# Patient Record
Sex: FEMALE | Race: WHITE | NOT HISPANIC OR LATINO | Employment: STUDENT | URBAN - METROPOLITAN AREA
[De-identification: names, ages, dates, MRNs, and addresses within clinical notes are randomized per-mention and may not be internally consistent; named-entity substitution may affect disease eponyms.]

---
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
## 2023-04-04 ENCOUNTER — HOSPITAL ENCOUNTER (INPATIENT)
Facility: HOSPITAL | Age: 18
LOS: 1 days | Discharge: HOME | DRG: 343 | End: 2023-04-05
Attending: EMERGENCY MEDICINE | Admitting: SURGERY
Payer: COMMERCIAL

## 2023-04-04 ENCOUNTER — APPOINTMENT (OUTPATIENT)
Dept: CT IMAGING | Facility: HOSPITAL | Age: 18
DRG: 343 | End: 2023-04-04
Payer: COMMERCIAL

## 2023-04-04 DIAGNOSIS — K35.80 APPENDICITIS, ACUTE: ICD-10-CM

## 2023-04-04 DIAGNOSIS — K35.80 ACUTE APPENDICITIS, UNSPECIFIED ACUTE APPENDICITIS TYPE: Primary | ICD-10-CM

## 2023-04-04 LAB
ALBUMIN SERPL-MCNC: 4.1 G/DL (ref 3.5–5.2)
ALBUMIN/GLOB SERPL: 1.4 G/DL
ALP SERPL-CCNC: 104 U/L (ref 39–117)
ALT SERPL W P-5'-P-CCNC: 16 U/L (ref 1–33)
ANION GAP SERPL CALCULATED.3IONS-SCNC: 10.4 MMOL/L (ref 5–15)
AST SERPL-CCNC: 19 U/L (ref 1–32)
BASOPHILS # BLD AUTO: 0.06 10*3/MM3 (ref 0–0.2)
BASOPHILS NFR BLD AUTO: 0.4 % (ref 0–1.5)
BILIRUB SERPL-MCNC: 0.5 MG/DL (ref 0–1.2)
BILIRUB UR QL STRIP: NEGATIVE
BUN SERPL-MCNC: 12 MG/DL (ref 6–20)
BUN/CREAT SERPL: 19.4 (ref 7–25)
CALCIUM SPEC-SCNC: 9.3 MG/DL (ref 8.6–10.5)
CHLORIDE SERPL-SCNC: 102 MMOL/L (ref 98–107)
CLARITY UR: ABNORMAL
CO2 SERPL-SCNC: 23.6 MMOL/L (ref 22–29)
COLOR UR: YELLOW
CREAT SERPL-MCNC: 0.62 MG/DL (ref 0.57–1)
DEPRECATED RDW RBC AUTO: 42.5 FL (ref 37–54)
EGFRCR SERPLBLD CKD-EPI 2021: 130.9 ML/MIN/1.73
EOSINOPHIL # BLD AUTO: 0.12 10*3/MM3 (ref 0–0.4)
EOSINOPHIL NFR BLD AUTO: 0.8 % (ref 0.3–6.2)
ERYTHROCYTE [DISTWIDTH] IN BLOOD BY AUTOMATED COUNT: 14.2 % (ref 12.3–15.4)
GLOBULIN UR ELPH-MCNC: 3 GM/DL
GLUCOSE SERPL-MCNC: 98 MG/DL (ref 65–99)
GLUCOSE UR STRIP-MCNC: NEGATIVE MG/DL
HCG INTACT+B SERPL-ACNC: <1 MIU/ML
HCT VFR BLD AUTO: 36.6 % (ref 34–46.6)
HGB BLD-MCNC: 11.7 G/DL (ref 12–15.9)
HGB UR QL STRIP.AUTO: NEGATIVE
HOLD SPECIMEN: NORMAL
HOLD SPECIMEN: NORMAL
IMM GRANULOCYTES # BLD AUTO: 0.06 10*3/MM3 (ref 0–0.05)
IMM GRANULOCYTES NFR BLD AUTO: 0.4 % (ref 0–0.5)
KETONES UR QL STRIP: NEGATIVE
LEUKOCYTE ESTERASE UR QL STRIP.AUTO: ABNORMAL
LIPASE SERPL-CCNC: 21 U/L (ref 13–60)
LYMPHOCYTES # BLD AUTO: 3.33 10*3/MM3 (ref 0.7–3.1)
LYMPHOCYTES NFR BLD AUTO: 21.3 % (ref 19.6–45.3)
MCH RBC QN AUTO: 26.5 PG (ref 26.6–33)
MCHC RBC AUTO-ENTMCNC: 32 G/DL (ref 31.5–35.7)
MCV RBC AUTO: 83 FL (ref 79–97)
MONOCYTES # BLD AUTO: 1.11 10*3/MM3 (ref 0.1–0.9)
MONOCYTES NFR BLD AUTO: 7.1 % (ref 5–12)
NEUTROPHILS NFR BLD AUTO: 10.95 10*3/MM3 (ref 1.7–7)
NEUTROPHILS NFR BLD AUTO: 70 % (ref 42.7–76)
NITRITE UR QL STRIP: NEGATIVE
NRBC BLD AUTO-RTO: 0 /100 WBC (ref 0–0.2)
PH UR STRIP.AUTO: 8 [PH] (ref 5–8)
PLATELET # BLD AUTO: 286 10*3/MM3 (ref 140–450)
PMV BLD AUTO: 11 FL (ref 6–12)
POTASSIUM SERPL-SCNC: 4.2 MMOL/L (ref 3.5–5.2)
PROT SERPL-MCNC: 7.1 G/DL (ref 6–8.5)
PROT UR QL STRIP: NEGATIVE
RBC # BLD AUTO: 4.41 10*6/MM3 (ref 3.77–5.28)
SODIUM SERPL-SCNC: 136 MMOL/L (ref 136–145)
SP GR UR STRIP: 1.02 (ref 1–1.03)
UROBILINOGEN UR QL STRIP: ABNORMAL
WBC NRBC COR # BLD: 15.63 10*3/MM3 (ref 3.4–10.8)
WHOLE BLOOD HOLD COAG: NORMAL
WHOLE BLOOD HOLD SPECIMEN: NORMAL

## 2023-04-04 PROCEDURE — 81001 URINALYSIS AUTO W/SCOPE: CPT | Performed by: EMERGENCY MEDICINE

## 2023-04-04 PROCEDURE — 99284 EMERGENCY DEPT VISIT MOD MDM: CPT

## 2023-04-04 PROCEDURE — 99285 EMERGENCY DEPT VISIT HI MDM: CPT

## 2023-04-04 PROCEDURE — 25010000002 MORPHINE PER 10 MG: Performed by: EMERGENCY MEDICINE

## 2023-04-04 PROCEDURE — 85025 COMPLETE CBC W/AUTO DIFF WBC: CPT

## 2023-04-04 PROCEDURE — 80053 COMPREHEN METABOLIC PANEL: CPT | Performed by: EMERGENCY MEDICINE

## 2023-04-04 PROCEDURE — 83690 ASSAY OF LIPASE: CPT | Performed by: EMERGENCY MEDICINE

## 2023-04-04 PROCEDURE — 84702 CHORIONIC GONADOTROPIN TEST: CPT

## 2023-04-04 PROCEDURE — 96375 TX/PRO/DX INJ NEW DRUG ADDON: CPT

## 2023-04-04 PROCEDURE — 0 DIATRIZOATE MEGLUMINE & SODIUM PER 1 ML: Performed by: EMERGENCY MEDICINE

## 2023-04-04 PROCEDURE — 25010000002 ONDANSETRON PER 1 MG: Performed by: EMERGENCY MEDICINE

## 2023-04-04 RX ORDER — SODIUM CHLORIDE 0.9 % (FLUSH) 0.9 %
10 SYRINGE (ML) INJECTION AS NEEDED
Status: DISCONTINUED | OUTPATIENT
Start: 2023-04-04 | End: 2023-04-05 | Stop reason: HOSPADM

## 2023-04-04 RX ORDER — ONDANSETRON 2 MG/ML
4 INJECTION INTRAMUSCULAR; INTRAVENOUS ONCE
Status: COMPLETED | OUTPATIENT
Start: 2023-04-04 | End: 2023-04-04

## 2023-04-04 RX ORDER — MORPHINE SULFATE 2 MG/ML
2 INJECTION, SOLUTION INTRAMUSCULAR; INTRAVENOUS ONCE
Status: COMPLETED | OUTPATIENT
Start: 2023-04-04 | End: 2023-04-04

## 2023-04-04 RX ADMIN — MORPHINE SULFATE 2 MG: 2 INJECTION, SOLUTION INTRAMUSCULAR; INTRAVENOUS at 23:22

## 2023-04-04 RX ADMIN — DIATRIZOATE MEGLUMINE AND DIATRIZOATE SODIUM 30 ML: 660; 100 LIQUID ORAL; RECTAL at 23:26

## 2023-04-04 RX ADMIN — SODIUM CHLORIDE 500 ML: 9 INJECTION, SOLUTION INTRAVENOUS at 23:21

## 2023-04-04 RX ADMIN — ONDANSETRON 4 MG: 2 INJECTION INTRAMUSCULAR; INTRAVENOUS at 23:22

## 2023-04-05 ENCOUNTER — APPOINTMENT (OUTPATIENT)
Dept: CT IMAGING | Facility: HOSPITAL | Age: 18
DRG: 343 | End: 2023-04-05
Payer: COMMERCIAL

## 2023-04-05 ENCOUNTER — ANESTHESIA (OUTPATIENT)
Dept: PERIOP | Facility: HOSPITAL | Age: 18
DRG: 343 | End: 2023-04-05
Payer: COMMERCIAL

## 2023-04-05 ENCOUNTER — ANESTHESIA EVENT (OUTPATIENT)
Dept: PERIOP | Facility: HOSPITAL | Age: 18
DRG: 343 | End: 2023-04-05
Payer: COMMERCIAL

## 2023-04-05 ENCOUNTER — READMISSION MANAGEMENT (OUTPATIENT)
Dept: CALL CENTER | Facility: HOSPITAL | Age: 18
End: 2023-04-05
Payer: COMMERCIAL

## 2023-04-05 VITALS
BODY MASS INDEX: 26.64 KG/M2 | SYSTOLIC BLOOD PRESSURE: 109 MMHG | OXYGEN SATURATION: 100 % | TEMPERATURE: 97 F | RESPIRATION RATE: 16 BRPM | DIASTOLIC BLOOD PRESSURE: 65 MMHG | HEIGHT: 63 IN | WEIGHT: 150.35 LBS | HEART RATE: 84 BPM

## 2023-04-05 PROBLEM — K35.80 ACUTE APPENDICITIS, UNSPECIFIED ACUTE APPENDICITIS TYPE: Status: RESOLVED | Noted: 2023-04-05 | Resolved: 2023-04-05

## 2023-04-05 PROBLEM — K35.80 ACUTE APPENDICITIS, UNSPECIFIED ACUTE APPENDICITIS TYPE: Status: ACTIVE | Noted: 2023-04-05

## 2023-04-05 LAB
AMORPH URATE CRY URNS QL MICRO: ABNORMAL /HPF
BACTERIA UR QL AUTO: ABNORMAL /HPF
HYALINE CASTS UR QL AUTO: ABNORMAL /LPF
RBC # UR STRIP: ABNORMAL /HPF
REF LAB TEST METHOD: ABNORMAL
SQUAMOUS #/AREA URNS HPF: ABNORMAL /HPF
WBC # UR STRIP: ABNORMAL /HPF

## 2023-04-05 PROCEDURE — 88304 TISSUE EXAM BY PATHOLOGIST: CPT | Performed by: SURGERY

## 2023-04-05 PROCEDURE — 25010000002 FENTANYL CITRATE (PF) 50 MCG/ML SOLUTION: Performed by: ANESTHESIOLOGY

## 2023-04-05 PROCEDURE — 25010000002 KETOROLAC TROMETHAMINE PER 15 MG: Performed by: NURSE ANESTHETIST, CERTIFIED REGISTERED

## 2023-04-05 PROCEDURE — 25010000002 FENTANYL CITRATE (PF) 50 MCG/ML SOLUTION: Performed by: NURSE ANESTHETIST, CERTIFIED REGISTERED

## 2023-04-05 PROCEDURE — 74177 CT ABD & PELVIS W/CONTRAST: CPT

## 2023-04-05 PROCEDURE — 99223 1ST HOSP IP/OBS HIGH 75: CPT | Performed by: SURGERY

## 2023-04-05 PROCEDURE — 44970 LAPAROSCOPY APPENDECTOMY: CPT | Performed by: SURGERY

## 2023-04-05 PROCEDURE — 25510000001 IOPAMIDOL 61 % SOLUTION: Performed by: EMERGENCY MEDICINE

## 2023-04-05 PROCEDURE — 25010000002 HYDROMORPHONE PER 4 MG: Performed by: NURSE ANESTHETIST, CERTIFIED REGISTERED

## 2023-04-05 PROCEDURE — 25010000002 DEXAMETHASONE SODIUM PHOSPHATE 20 MG/5ML SOLUTION: Performed by: NURSE ANESTHETIST, CERTIFIED REGISTERED

## 2023-04-05 PROCEDURE — 44970 LAPAROSCOPY APPENDECTOMY: CPT | Performed by: SPECIALIST/TECHNOLOGIST, OTHER

## 2023-04-05 PROCEDURE — 25010000002 PROPOFOL 10 MG/ML EMULSION: Performed by: NURSE ANESTHETIST, CERTIFIED REGISTERED

## 2023-04-05 PROCEDURE — 25010000002 ONDANSETRON PER 1 MG: Performed by: NURSE ANESTHETIST, CERTIFIED REGISTERED

## 2023-04-05 PROCEDURE — 25010000002 LEVOFLOXACIN PER 250 MG: Performed by: EMERGENCY MEDICINE

## 2023-04-05 PROCEDURE — 0DTJ4ZZ RESECTION OF APPENDIX, PERCUTANEOUS ENDOSCOPIC APPROACH: ICD-10-PCS | Performed by: SURGERY

## 2023-04-05 PROCEDURE — 25010000002 FENTANYL CITRATE (PF) 50 MCG/ML SOLUTION

## 2023-04-05 PROCEDURE — 96365 THER/PROPH/DIAG IV INF INIT: CPT

## 2023-04-05 DEVICE — HORIZON TI ML 6 CLIPS/CART
Type: IMPLANTABLE DEVICE | Site: ASCENDING COLON | Status: FUNCTIONAL
Brand: WECK

## 2023-04-05 DEVICE — THE ECHELON, ECHELON ENDOPATH™ AND ECHELON FLEX™ FAMILIES OF ENDOSCOPIC LINEAR CUTTERS AND RELOADS ARE STERILE, SINGLE PATIENT USE INSTRUMENTS THAT SIMULTANEOUSLY CUT AND STAPLE TISSUE. THERE ARE SIX STAGGERED ROWS OF STAPLES, THREE ON EITHER SIDE OF THE CUT LINE. THE 45 MM INSTRUMENTS HAVE A STAPLE LINE THATIS APPROXIMATELY 45 MM LONG AND A CUT LINE THAT IS APPROXIMATELY 42 MM LONG. THE SHAFT CAN ROTATE FREELY IN BOTH DIRECTIONS AND AN ARTICULATION MECHANISM ON ARTICULATING INSTRUMENTS ENABLES BENDING THE DISTAL PORTIONOF THE SHAFT TO FACILITATE LATERAL ACCESS OF THE OPERATIVE SITE.THE INSTRUMENTS ARE SHIPPED WITHOUT A RELOAD AND MUST BE LOADED PRIOR TO USE. A STAPLE RETAINING CAP ON THE RELOAD PROTECTS THE STAPLE LEG POINTS DURING SHIPPING AND TRANSPORTATION. THE INSTRUMENTS’ LOCK-OUT FEATURE IS DESIGNED TO PREVENT A USED RELOAD FROM BEING REFIRED.
Type: IMPLANTABLE DEVICE | Site: ABDOMEN | Status: FUNCTIONAL
Brand: ECHELON ENDOPATH

## 2023-04-05 DEVICE — ENDOSCOPIC LINEAR CUTTER RELOADS GRAY 2.0 MM
Type: IMPLANTABLE DEVICE | Site: ABDOMEN | Status: FUNCTIONAL
Brand: ECHELON; ENDOPATH

## 2023-04-05 RX ORDER — FENTANYL CITRATE 50 UG/ML
INJECTION, SOLUTION INTRAMUSCULAR; INTRAVENOUS
Status: COMPLETED
Start: 2023-04-05 | End: 2023-04-05

## 2023-04-05 RX ORDER — LIDOCAINE HYDROCHLORIDE 10 MG/ML
0.5 INJECTION, SOLUTION EPIDURAL; INFILTRATION; INTRACAUDAL; PERINEURAL ONCE AS NEEDED
Status: DISCONTINUED | OUTPATIENT
Start: 2023-04-05 | End: 2023-04-05 | Stop reason: HOSPADM

## 2023-04-05 RX ORDER — LEVOFLOXACIN 5 MG/ML
750 INJECTION, SOLUTION INTRAVENOUS ONCE
Status: COMPLETED | OUTPATIENT
Start: 2023-04-05 | End: 2023-04-05

## 2023-04-05 RX ORDER — AMOXICILLIN 250 MG
1 CAPSULE ORAL DAILY
Qty: 60 TABLET | Refills: 0 | Status: SHIPPED | OUTPATIENT
Start: 2023-04-05

## 2023-04-05 RX ORDER — METRONIDAZOLE 500 MG/100ML
500 INJECTION, SOLUTION INTRAVENOUS ONCE
Status: COMPLETED | OUTPATIENT
Start: 2023-04-05 | End: 2023-04-05

## 2023-04-05 RX ORDER — HYDROMORPHONE HYDROCHLORIDE 1 MG/ML
0.5 INJECTION, SOLUTION INTRAMUSCULAR; INTRAVENOUS; SUBCUTANEOUS
Status: DISCONTINUED | OUTPATIENT
Start: 2023-04-05 | End: 2023-04-05 | Stop reason: HOSPADM

## 2023-04-05 RX ORDER — PROMETHAZINE HYDROCHLORIDE 25 MG/1
25 TABLET ORAL ONCE AS NEEDED
Status: DISCONTINUED | OUTPATIENT
Start: 2023-04-05 | End: 2023-04-05 | Stop reason: HOSPADM

## 2023-04-05 RX ORDER — DEXAMETHASONE SODIUM PHOSPHATE 4 MG/ML
INJECTION, SOLUTION INTRA-ARTICULAR; INTRALESIONAL; INTRAMUSCULAR; INTRAVENOUS; SOFT TISSUE AS NEEDED
Status: DISCONTINUED | OUTPATIENT
Start: 2023-04-05 | End: 2023-04-05 | Stop reason: SURG

## 2023-04-05 RX ORDER — PROMETHAZINE HYDROCHLORIDE 25 MG/1
25 SUPPOSITORY RECTAL ONCE AS NEEDED
Status: DISCONTINUED | OUTPATIENT
Start: 2023-04-05 | End: 2023-04-05 | Stop reason: HOSPADM

## 2023-04-05 RX ORDER — MIDAZOLAM HYDROCHLORIDE 1 MG/ML
1 INJECTION INTRAMUSCULAR; INTRAVENOUS
Status: DISCONTINUED | OUTPATIENT
Start: 2023-04-05 | End: 2023-04-05 | Stop reason: HOSPADM

## 2023-04-05 RX ORDER — KETOROLAC TROMETHAMINE 30 MG/ML
INJECTION, SOLUTION INTRAMUSCULAR; INTRAVENOUS AS NEEDED
Status: DISCONTINUED | OUTPATIENT
Start: 2023-04-05 | End: 2023-04-05 | Stop reason: SURG

## 2023-04-05 RX ORDER — FENTANYL CITRATE 50 UG/ML
50 INJECTION, SOLUTION INTRAMUSCULAR; INTRAVENOUS
Status: DISCONTINUED | OUTPATIENT
Start: 2023-04-05 | End: 2023-04-05 | Stop reason: HOSPADM

## 2023-04-05 RX ORDER — FAMOTIDINE 10 MG/ML
20 INJECTION, SOLUTION INTRAVENOUS ONCE
Status: COMPLETED | OUTPATIENT
Start: 2023-04-05 | End: 2023-04-05

## 2023-04-05 RX ORDER — NALOXONE HCL 0.4 MG/ML
0.2 VIAL (ML) INJECTION AS NEEDED
Status: DISCONTINUED | OUTPATIENT
Start: 2023-04-05 | End: 2023-04-05 | Stop reason: HOSPADM

## 2023-04-05 RX ORDER — EPHEDRINE SULFATE 50 MG/ML
5 INJECTION, SOLUTION INTRAVENOUS ONCE AS NEEDED
Status: DISCONTINUED | OUTPATIENT
Start: 2023-04-05 | End: 2023-04-05 | Stop reason: HOSPADM

## 2023-04-05 RX ORDER — HYDRALAZINE HYDROCHLORIDE 20 MG/ML
5 INJECTION INTRAMUSCULAR; INTRAVENOUS
Status: DISCONTINUED | OUTPATIENT
Start: 2023-04-05 | End: 2023-04-05 | Stop reason: HOSPADM

## 2023-04-05 RX ORDER — LABETALOL HYDROCHLORIDE 5 MG/ML
5 INJECTION, SOLUTION INTRAVENOUS
Status: DISCONTINUED | OUTPATIENT
Start: 2023-04-05 | End: 2023-04-05 | Stop reason: HOSPADM

## 2023-04-05 RX ORDER — METRONIDAZOLE 500 MG/100ML
500 INJECTION, SOLUTION INTRAVENOUS EVERY 8 HOURS
Status: DISCONTINUED | OUTPATIENT
Start: 2023-04-05 | End: 2023-04-05 | Stop reason: HOSPADM

## 2023-04-05 RX ORDER — ACETAMINOPHEN 325 MG/1
650 TABLET ORAL EVERY 6 HOURS PRN
Status: DISCONTINUED | OUTPATIENT
Start: 2023-04-05 | End: 2023-04-05 | Stop reason: HOSPADM

## 2023-04-05 RX ORDER — IPRATROPIUM BROMIDE 42 UG/1
2 SPRAY, METERED NASAL 4 TIMES DAILY
Status: DISCONTINUED | OUTPATIENT
Start: 2023-04-05 | End: 2023-04-05 | Stop reason: HOSPADM

## 2023-04-05 RX ORDER — SODIUM CHLORIDE, SODIUM LACTATE, POTASSIUM CHLORIDE, CALCIUM CHLORIDE 600; 310; 30; 20 MG/100ML; MG/100ML; MG/100ML; MG/100ML
75 INJECTION, SOLUTION INTRAVENOUS CONTINUOUS
Status: DISCONTINUED | OUTPATIENT
Start: 2023-04-05 | End: 2023-04-05 | Stop reason: HOSPADM

## 2023-04-05 RX ORDER — DIPHENHYDRAMINE HYDROCHLORIDE 50 MG/ML
12.5 INJECTION INTRAMUSCULAR; INTRAVENOUS
Status: DISCONTINUED | OUTPATIENT
Start: 2023-04-05 | End: 2023-04-05 | Stop reason: HOSPADM

## 2023-04-05 RX ORDER — SCOLOPAMINE TRANSDERMAL SYSTEM 1 MG/1
1 PATCH, EXTENDED RELEASE TRANSDERMAL ONCE
Status: DISCONTINUED | OUTPATIENT
Start: 2023-04-05 | End: 2023-04-05 | Stop reason: HOSPADM

## 2023-04-05 RX ORDER — FLUMAZENIL 0.1 MG/ML
0.2 INJECTION INTRAVENOUS AS NEEDED
Status: DISCONTINUED | OUTPATIENT
Start: 2023-04-05 | End: 2023-04-05 | Stop reason: HOSPADM

## 2023-04-05 RX ORDER — BUPIVACAINE HYDROCHLORIDE AND EPINEPHRINE 5; 5 MG/ML; UG/ML
INJECTION, SOLUTION EPIDURAL; INTRACAUDAL; PERINEURAL AS NEEDED
Status: DISCONTINUED | OUTPATIENT
Start: 2023-04-05 | End: 2023-04-05 | Stop reason: HOSPADM

## 2023-04-05 RX ORDER — OXYCODONE HYDROCHLORIDE AND ACETAMINOPHEN 5; 325 MG/1; MG/1
1 TABLET ORAL ONCE AS NEEDED
Status: DISCONTINUED | OUTPATIENT
Start: 2023-04-05 | End: 2023-04-05 | Stop reason: HOSPADM

## 2023-04-05 RX ORDER — OXYCODONE HYDROCHLORIDE AND ACETAMINOPHEN 5; 325 MG/1; MG/1
1 TABLET ORAL EVERY 6 HOURS PRN
Qty: 20 TABLET | Refills: 0 | Status: SHIPPED | OUTPATIENT
Start: 2023-04-05

## 2023-04-05 RX ORDER — MAGNESIUM HYDROXIDE 1200 MG/15ML
LIQUID ORAL AS NEEDED
Status: DISCONTINUED | OUTPATIENT
Start: 2023-04-05 | End: 2023-04-05 | Stop reason: HOSPADM

## 2023-04-05 RX ORDER — IPRATROPIUM BROMIDE AND ALBUTEROL SULFATE 2.5; .5 MG/3ML; MG/3ML
3 SOLUTION RESPIRATORY (INHALATION) ONCE AS NEEDED
Status: DISCONTINUED | OUTPATIENT
Start: 2023-04-05 | End: 2023-04-05 | Stop reason: HOSPADM

## 2023-04-05 RX ORDER — ONDANSETRON 4 MG/1
4 TABLET, FILM COATED ORAL EVERY 8 HOURS PRN
Qty: 10 TABLET | Refills: 0 | Status: SHIPPED | OUTPATIENT
Start: 2023-04-05 | End: 2023-04-21

## 2023-04-05 RX ORDER — SODIUM CHLORIDE, SODIUM LACTATE, POTASSIUM CHLORIDE, CALCIUM CHLORIDE 600; 310; 30; 20 MG/100ML; MG/100ML; MG/100ML; MG/100ML
9 INJECTION, SOLUTION INTRAVENOUS CONTINUOUS
Status: DISCONTINUED | OUTPATIENT
Start: 2023-04-05 | End: 2023-04-05 | Stop reason: HOSPADM

## 2023-04-05 RX ORDER — ROCURONIUM BROMIDE 10 MG/ML
INJECTION, SOLUTION INTRAVENOUS AS NEEDED
Status: DISCONTINUED | OUTPATIENT
Start: 2023-04-05 | End: 2023-04-05 | Stop reason: SURG

## 2023-04-05 RX ORDER — LEVOFLOXACIN 500 MG/1
500 TABLET, FILM COATED ORAL DAILY
Qty: 4 TABLET | Refills: 0 | Status: SHIPPED | OUTPATIENT
Start: 2023-04-05 | End: 2023-04-09

## 2023-04-05 RX ORDER — PROPOFOL 10 MG/ML
VIAL (ML) INTRAVENOUS AS NEEDED
Status: DISCONTINUED | OUTPATIENT
Start: 2023-04-05 | End: 2023-04-05 | Stop reason: SURG

## 2023-04-05 RX ORDER — ONDANSETRON 2 MG/ML
4 INJECTION INTRAMUSCULAR; INTRAVENOUS ONCE AS NEEDED
Status: DISCONTINUED | OUTPATIENT
Start: 2023-04-05 | End: 2023-04-05 | Stop reason: HOSPADM

## 2023-04-05 RX ORDER — ONDANSETRON 2 MG/ML
INJECTION INTRAMUSCULAR; INTRAVENOUS AS NEEDED
Status: DISCONTINUED | OUTPATIENT
Start: 2023-04-05 | End: 2023-04-05 | Stop reason: SURG

## 2023-04-05 RX ORDER — METRONIDAZOLE 500 MG/1
500 TABLET ORAL 3 TIMES DAILY
Qty: 12 TABLET | Refills: 0 | Status: SHIPPED | OUTPATIENT
Start: 2023-04-05 | End: 2023-04-09

## 2023-04-05 RX ORDER — DROPERIDOL 2.5 MG/ML
0.62 INJECTION, SOLUTION INTRAMUSCULAR; INTRAVENOUS
Status: DISCONTINUED | OUTPATIENT
Start: 2023-04-05 | End: 2023-04-05 | Stop reason: HOSPADM

## 2023-04-05 RX ORDER — FENTANYL CITRATE 50 UG/ML
50 INJECTION, SOLUTION INTRAMUSCULAR; INTRAVENOUS
Status: COMPLETED | OUTPATIENT
Start: 2023-04-05 | End: 2023-04-05

## 2023-04-05 RX ORDER — SODIUM CHLORIDE 0.9 % (FLUSH) 0.9 %
3 SYRINGE (ML) INJECTION EVERY 12 HOURS SCHEDULED
Status: DISCONTINUED | OUTPATIENT
Start: 2023-04-05 | End: 2023-04-05 | Stop reason: HOSPADM

## 2023-04-05 RX ORDER — LEVOFLOXACIN 5 MG/ML
750 INJECTION, SOLUTION INTRAVENOUS EVERY 24 HOURS
Status: DISCONTINUED | OUTPATIENT
Start: 2023-04-06 | End: 2023-04-05 | Stop reason: HOSPADM

## 2023-04-05 RX ORDER — DOCUSATE SODIUM 100 MG/1
100 CAPSULE, LIQUID FILLED ORAL 2 TIMES DAILY PRN
Status: DISCONTINUED | OUTPATIENT
Start: 2023-04-05 | End: 2023-04-05 | Stop reason: HOSPADM

## 2023-04-05 RX ORDER — HYDROCODONE BITARTRATE AND ACETAMINOPHEN 7.5; 325 MG/1; MG/1
1 TABLET ORAL ONCE AS NEEDED
Status: DISCONTINUED | OUTPATIENT
Start: 2023-04-05 | End: 2023-04-05 | Stop reason: HOSPADM

## 2023-04-05 RX ORDER — OXYCODONE HYDROCHLORIDE 5 MG/1
5 TABLET ORAL EVERY 4 HOURS PRN
Status: DISCONTINUED | OUTPATIENT
Start: 2023-04-05 | End: 2023-04-05 | Stop reason: HOSPADM

## 2023-04-05 RX ORDER — SODIUM CHLORIDE 0.9 % (FLUSH) 0.9 %
3-10 SYRINGE (ML) INJECTION AS NEEDED
Status: DISCONTINUED | OUTPATIENT
Start: 2023-04-05 | End: 2023-04-05 | Stop reason: HOSPADM

## 2023-04-05 RX ORDER — FENTANYL CITRATE 50 UG/ML
INJECTION, SOLUTION INTRAMUSCULAR; INTRAVENOUS AS NEEDED
Status: DISCONTINUED | OUTPATIENT
Start: 2023-04-05 | End: 2023-04-05 | Stop reason: SURG

## 2023-04-05 RX ORDER — LIDOCAINE HYDROCHLORIDE 20 MG/ML
INJECTION, SOLUTION INFILTRATION; PERINEURAL AS NEEDED
Status: DISCONTINUED | OUTPATIENT
Start: 2023-04-05 | End: 2023-04-05 | Stop reason: SURG

## 2023-04-05 RX ORDER — PROMETHAZINE HYDROCHLORIDE 12.5 MG/1
12.5 TABLET ORAL ONCE AS NEEDED
Status: DISCONTINUED | OUTPATIENT
Start: 2023-04-05 | End: 2023-04-05 | Stop reason: HOSPADM

## 2023-04-05 RX ORDER — OXYCODONE AND ACETAMINOPHEN 7.5; 325 MG/1; MG/1
1 TABLET ORAL EVERY 4 HOURS PRN
Status: DISCONTINUED | OUTPATIENT
Start: 2023-04-05 | End: 2023-04-05 | Stop reason: HOSPADM

## 2023-04-05 RX ADMIN — SCOPALAMINE 1 PATCH: 1 PATCH, EXTENDED RELEASE TRANSDERMAL at 11:38

## 2023-04-05 RX ADMIN — FAMOTIDINE 20 MG: 10 INJECTION INTRAVENOUS at 11:34

## 2023-04-05 RX ADMIN — FENTANYL CITRATE 50 MCG: 50 INJECTION, SOLUTION INTRAMUSCULAR; INTRAVENOUS at 17:28

## 2023-04-05 RX ADMIN — DEXAMETHASONE SODIUM PHOSPHATE 8 MG: 4 INJECTION, SOLUTION INTRAMUSCULAR; INTRAVENOUS at 16:04

## 2023-04-05 RX ADMIN — PROPOFOL 150 MG: 10 INJECTION, EMULSION INTRAVENOUS at 15:58

## 2023-04-05 RX ADMIN — HYDROMORPHONE HYDROCHLORIDE 0.5 MG: 1 INJECTION, SOLUTION INTRAMUSCULAR; INTRAVENOUS; SUBCUTANEOUS at 17:52

## 2023-04-05 RX ADMIN — LEVOFLOXACIN 750 MG: 5 INJECTION, SOLUTION INTRAVENOUS at 01:57

## 2023-04-05 RX ADMIN — ONDANSETRON 4 MG: 2 INJECTION INTRAMUSCULAR; INTRAVENOUS at 16:28

## 2023-04-05 RX ADMIN — IOPAMIDOL 85 ML: 612 INJECTION, SOLUTION INTRAVENOUS at 01:16

## 2023-04-05 RX ADMIN — SUGAMMADEX 200 MG: 100 INJECTION, SOLUTION INTRAVENOUS at 16:47

## 2023-04-05 RX ADMIN — HYDROMORPHONE HYDROCHLORIDE 0.5 MG: 1 INJECTION, SOLUTION INTRAMUSCULAR; INTRAVENOUS; SUBCUTANEOUS at 17:40

## 2023-04-05 RX ADMIN — FENTANYL CITRATE 50 MCG: 50 INJECTION, SOLUTION INTRAMUSCULAR; INTRAVENOUS at 15:56

## 2023-04-05 RX ADMIN — KETOROLAC TROMETHAMINE 30 MG: 30 INJECTION, SOLUTION INTRAMUSCULAR at 16:28

## 2023-04-05 RX ADMIN — LIDOCAINE HYDROCHLORIDE 100 MG: 20 INJECTION, SOLUTION INFILTRATION; PERINEURAL at 15:57

## 2023-04-05 RX ADMIN — METRONIDAZOLE 500 MG: 500 INJECTION, SOLUTION INTRAVENOUS at 02:26

## 2023-04-05 RX ADMIN — SODIUM CHLORIDE, POTASSIUM CHLORIDE, SODIUM LACTATE AND CALCIUM CHLORIDE 75 ML/HR: 600; 310; 30; 20 INJECTION, SOLUTION INTRAVENOUS at 05:10

## 2023-04-05 RX ADMIN — SODIUM CHLORIDE, POTASSIUM CHLORIDE, SODIUM LACTATE AND CALCIUM CHLORIDE 9 ML/HR: 600; 310; 30; 20 INJECTION, SOLUTION INTRAVENOUS at 11:34

## 2023-04-05 RX ADMIN — METRONIDAZOLE 500 MG: 500 INJECTION, SOLUTION INTRAVENOUS at 10:54

## 2023-04-05 RX ADMIN — ROCURONIUM BROMIDE 40 MG: 10 INJECTION, SOLUTION INTRAVENOUS at 15:58

## 2023-04-05 RX ADMIN — FENTANYL CITRATE 50 MCG: 0.05 INJECTION, SOLUTION INTRAMUSCULAR; INTRAVENOUS at 13:30

## 2023-04-05 RX ADMIN — FENTANYL CITRATE 50 MCG: 50 INJECTION, SOLUTION INTRAMUSCULAR; INTRAVENOUS at 16:47

## 2023-04-05 RX ADMIN — FENTANYL CITRATE 50 MCG: 0.05 INJECTION, SOLUTION INTRAMUSCULAR; INTRAVENOUS at 11:34

## 2023-04-05 NOTE — H&P
"Admission H&P    Admiting Physician: Robert Bagley MD    Reason for admission: Acute appendicitis.    Chief Complaint   Patient presents with   • Abdominal Pain       HPI:   The patient is a very pleasant 18 y.o. years old female that presented to the hospital with abdominal pain.  The pain is described as aching and sharp, and is 7/10 in intensity. Pain is located in the RLQ, suprapubic without radiation. Onset was several hours ago. Symptoms have been gradually improving since. Aggravating factors: activity.  Alleviating factors: recumbency. Associated symptoms: anorexia and nausea. The patient denies chills, fever, sweats and vomiting.   CT scan of the abdomen was performed that showed evidence of acute appendicitis     Past Medical History:   Diagnosis Date   • Epistaxis    • Menorrhagia    • Nexplanon insertion    • Sinus arrhythmia        History reviewed. No pertinent surgical history.      Current Facility-Administered Medications:   •  acetaminophen (TYLENOL) tablet 650 mg, 650 mg, Oral, Q6H PRN, Mary Cardenas MD  •  lactated ringers infusion, 75 mL/hr, Intravenous, Continuous, Mary Cardenas MD, Last Rate: 75 mL/hr at 04/05/23 0510, 75 mL/hr at 04/05/23 0510  •  [START ON 4/6/2023] levoFLOXacin (LEVAQUIN) 750 mg/150 mL D5W (premix) (LEVAQUIN) 750 mg, 750 mg, Intravenous, Q24H, Robert aBgley MD  •  metroNIDAZOLE (FLAGYL) IVPB 500 mg, 500 mg, Intravenous, Q8H, Robert Bagley MD  •  oxyCODONE (ROXICODONE) immediate release tablet 5 mg, 5 mg, Oral, Q4H PRN, Mary Cardenas MD  •  sodium chloride 0.9 % flush 10 mL, 10 mL, Intravenous, PRN, Lc Perry MD    Allergies   Allergen Reactions   • Amoxicillin Anaphylaxis   • Penicillins Hives     Big whelps, any \"cillin\"       Social History     Socioeconomic History   • Marital status: Single   Tobacco Use   • Smoking status: Never   • Smokeless tobacco: Never   Vaping Use   • Vaping Use: Some days   Substance and " Sexual Activity   • Alcohol use: No   • Drug use: Yes     Types: Marijuana     Comment: very occasionally       Family History   Family history unknown: Yes       ROS:   Constitutional: denies any weight changes, fatigue or weakness.  Eyes: : denies blurred or double vision  Cardiovascular: denies chest pain, palpitations, edemas.  Respiratory: denies cough, sputum, SOB.  Gastrointestinal: denies , diarrhea, constipation.  Genitourinary: denies dysuria, frequency.  Endocrine: denies cold intolerance, lethargy and flushing.  Hem: denies excessive bruising and postop bleeding.  Musculoskeletal: denies weakness, joint swelling, pain or stiffness.  Neuro: denies seizures, CVA, paresthesia, or peripheral neuropathy.   Skin: denies change in nevi, rashes, masses.    Physical Exam:   Vitals:    04/05/23 0521   BP: 97/60   Pulse: 96   Resp: 16   Temp: 97.1 °F (36.2 °C)   SpO2: 98%     GENERAL:alert, well appearing, and in no distress and oriented to person, place, and time  HEENT: normochephalic, atraumatic, no scleral icterus moist mucous membranes.  NECK: Supple there is no thyromegaly or lymphadenopathy  CHEST: clear to auscultation, no wheezes, rales or rhonchi, symmetric air entry  CARDIAC: regular rate and rhythm    ABDOMEN: soft,  nondistended, no masses or organomegaly  tenderness noted over right lower quadrant and suprapubic area. No rebound or guarding, no peritoneal signs  EXTREMITIES: no cyanosis, clubbing or edema    NEURO: alert and oriented, normal speech, cranial nerves 2-12 grossly intact, no focal deficits   SKIN: Moist, warm, no rashes.    Diagnostic workup:   CT abdomen and pelvis: dilated and thick appendix consistent with acute appendicitis    Wbc 15.6  hgb 11.7  cmp wnl    Assessment and plan:   The patient is a very pleasant 18 y.o. years old female with clinical as well as imaging findings of acute appendicitis.  I discussed with the patient about further step and option for treatment that would  include conservative management with antibiotic treatment versus laparoscopic appendectomy.  Risk and benefits of both approaches including approximately 50% failure with antibiotic therapy alone and the risk of bleeding, infections, intra-abdominal injuries, intra-abdominal abscess and stump leaks for laparoscopic appendectomy.  Patient has elected to undergo laparoscopic appendectomy.    Plan:    - Laparoscopic appendectomy, possible open.   - NPO  - IVF  - IV antibiotics  - Consent for laparoscopic appendectomy possible open    Case was discussed with Mary Cardenas MD and patient.    Risk and benefits of the current recommended treatment were explained to the patient that had time to ask questions that where answered, verbalized understanding and agreed with the plan.     Robert Bagley MD  General, Minimally Invasive and Endoscopic Surgery  [facility]    [address], [address]  [place], [place], [address]  P: [phone]  F: [phone]

## 2023-04-05 NOTE — ANESTHESIA PROCEDURE NOTES
Airway  Urgency: elective    Date/Time: 4/5/2023 4:00 PM  Airway not difficult    General Information and Staff    Patient location during procedure: OR  Anesthesiologist: Mic Gao MD  CRNA/CAA: Demetrius Seymour CRNA    Indications and Patient Condition  Indications for airway management: airway protection    Preoxygenated: yes  Mask difficulty assessment: 1 - vent by mask    Final Airway Details  Final airway type: endotracheal airway      Successful airway: ETT  Cuffed: yes   Successful intubation technique: direct laryngoscopy  Endotracheal tube insertion site: oral  Blade: Pablo  Blade size: 2  ETT size (mm): 7.0  Cormack-Lehane Classification: grade I - full view of glottis  Placement verified by: chest auscultation and capnometry   Measured from: lips  ETT/EBT  to lips (cm): 22  Number of attempts at approach: 1  Assessment: lips, teeth, and gum same as pre-op and atraumatic intubation    Additional Comments  Pre 02 100%, SIVI, DL x1, atraumatic intubation, BLBS, Positive ETC02.

## 2023-04-05 NOTE — ANESTHESIA PREPROCEDURE EVALUATION
Anesthesia Evaluation     Patient summary reviewed and Nursing notes reviewed   no history of anesthetic complications:  NPO Solid Status: > 8 hours  NPO Liquid Status: > 8 hours           Airway   Mallampati: II  Dental - normal exam     Pulmonary - normal exam   (+) a smoker Current,   Cardiovascular - negative cardio ROS and normal exam        Neuro/Psych- negative ROS  GI/Hepatic/Renal/Endo - negative ROS     Musculoskeletal     Abdominal    Substance History      OB/GYN          Other                        Anesthesia Plan    ASA 2     general     intravenous induction     Anesthetic plan, risks, benefits, and alternatives have been provided, discussed and informed consent has been obtained with: patient.        CODE STATUS:    Level Of Support Discussed With: Patient  Code Status (Patient has no pulse and is not breathing): CPR (Attempt to Resuscitate)  Medical Interventions (Patient has pulse or is breathing): Full Support  Release to patient: Routine Release

## 2023-04-05 NOTE — ANESTHESIA POSTPROCEDURE EVALUATION
"Patient: Gloria DOWNING Shorty    Procedure Summary     Date: 04/05/23 Room / Location: [facility] OR 09 / [facility] MAIN OR    Anesthesia Start: 1552 Anesthesia Stop: 1704    Procedure: APPENDECTOMY LAPAROSCOPIC (Abdomen) Diagnosis:     Surgeons: Robert Bagley MD Provider: Mic Gao MD    Anesthesia Type: general ASA Status: 2          Anesthesia Type: general    Vitals  Vitals Value Taken Time   /71 04/05/23 1816   Temp 36.7 °C (98 °F) 04/05/23 1700   Pulse 84 04/05/23 1817   Resp 16 04/05/23 1800   SpO2 100 % 04/05/23 1818   Vitals shown include unvalidated device data.        Post Anesthesia Care and Evaluation    Patient location during evaluation: bedside  Patient participation: complete - patient participated  Level of consciousness: awake and alert  Pain management: adequate    Airway patency: patent  Anesthetic complications: No anesthetic complications    Cardiovascular status: acceptable  Respiratory status: acceptable  Hydration status: acceptable    Comments: /65 (BP Location: Right arm, Patient Position: Lying)   Pulse 84   Temp 36.1 °C (97 °F)   Resp 16   Ht 160 cm (63\")   Wt 68.2 kg (150 lb 5.7 oz)   LMP 04/01/2023 Comment: serum hcg negative  SpO2 100%   BMI 26.63 kg/m²       "

## 2023-04-05 NOTE — OP NOTE
PREOPERATIVE DIAGNOSIS:  Acute Appendicitis  POSTOPERATIVE DIAGNOSIS:  Acute suppurative appendicitis   PROCEDURE:  Laparoscopic Appendectomy  SURGEON/STAFF:  JOANNE Bagley  ASSISTANT: TONY Forbes charge of retraction, exposure, closing wounds, holding camera and placing dressings as essential for the success of the case  ANESTHESIA:  General.   BLOOD LOSS: Minimal  COUNTS:  Needle and sponge count correct.  SPECIMENS:  Appendix    Findings: Acute appendicitis    INDICATIONS FOR OPERATION:  Gloria Oro is a 18 y.o. year old female who presented to to the ED for evaluation of abdominal pain.  She was found to have acute appendicitis on imaging.  She was examined and she was found to have findings consistent with acute appendicitis.  I discussed with the patient about treatment options that would include conservative management with antibiotics versus laparoscopic appendectomy.  Risk and benefits of both approaches were discussed in detail with the patient including the possibility of 50% failure of antibiotic only treatment versus the risk of bleeding, infection, abscess formation, intra-abdominal injuries, stump leak and the risk of anesthesia. After considering risk and benefits the patient has decided to proceed with laparoscopic appendectomy.    OPERATION:  The patient was brought to the operating room in stable condition.   Preoperative antibiotics were given and sequential compression devices were applied.  At this time, the patient was laid supine on the operating room table.  General anesthesia was induced by the Anesthesia service without difficulty. A tao catheter was placed by the nursing staff.  The patient's abdomen was prepped and draped in the usual sterile fashion.      At this time, the patient's abdomen was accessed at the level of the umbilicus with an open cutdown technique and insertion of a 5 mm trocar.  The abdomen was insufflated.  Brief survey of the abdominal cavity revealed no  intra-abdominal injury, and a large inflamed appendix.  The operation was continued by insertion of 2 additional 5 mm trocars, one in the mid clavicular line between the asis and umbilicus, and one in the suprapubic region.  The umbilical 5 mm port was replaced with a 12 mm port to allow for specimen removal and stapler passage. These were inserted under direct view.  The appendix was inflamed and adhered to the cecum.  The retroappendiceal window was created at the base of the appendix. A single firing of a [place] Laparoscopic stapler with a white load was used to transect the appendix at it's base. Here there was viable tissue, that was soft to touch.     I then continued dissection of the appendix from the cecum.  At the level of the tip the appendix was very close to the cecum and this was transected with ultrasonic alissa.  The mesentery of the appendix was then transected with ultrasonic alissa.  Because of the proximity of the ultrasonic shear to the cecum during dissection I decided to place a figure of eight 2-0 silk suture at the cecum.  The cecum look healthy and viable.  A small metallic clip was used over the appendix staple line.  Determinate the trocar was removed and the umbilical fascia was closed with 0 Vicryl Endo Close technique.  The appendix was then removed through the umbilical port site with the aid of an endo catch bag.    Next, the abdomen was inspected and irrigated.  The field was completely clean with no evidence of intra-abdominal injury or bleeding.  The 12 mm trocar was removed and the fascial defect was closed with 0 Vicryl and Endo Close technique.  All trocars were then removed under direct vision.  All skin incisions were closed with 4-0 Monocryl and surgical glue.      The patient was extubated in the recovery room in stable condition.  I, the attending surgeon, performed the entire operation.    Robert Bagley MD  General, Minimally Invasive and Endoscopic Surgery  [facility]    [address], [address]  [place], [place], [address]  P: [phone]  F: [phone]

## 2023-04-05 NOTE — ED NOTES
Pt reports sudden onset RLQ pain started this afternoon. The pain is accompanied by nausea and is constant in nature. Pt has not had any abdominal surgeries. Pt denies fever/chills. Pt was seen at urgent care today and they were concerned for appendicitis. Pt has a family history of issues with the appendix.

## 2023-04-05 NOTE — PLAN OF CARE
Goal Outcome Evaluation:              Outcome Evaluation: Patient up from ER, IVF infusing, NPO, up ad randy, voiding freely, will continue to monitor

## 2023-04-05 NOTE — ED PROVIDER NOTES
EMERGENCY DEPARTMENT ENCOUNTER    Room Number:  P681/1  Date of encounter:  4/5/2023  PCP: Kehrer, Meredith Lea, MD  Patient Care Team:  Kehrer, Meredith Lea, MD as PCP - General   Independent Historians: Patient    HPI:  Chief Complaint: Right lower quadrant pain    A complete HPI/ROS/PMH/PSH/SH/FH are unobtainable due to: None    Chronic or social conditions impacting patient care (Social Determinants of Health): None  (Financial Resource Strain / Food Insecurity / Transportation Needs / Physical Activity / Stress / Social Connections / Intimate Partner Violence / Housing Stability)    Context: Gloria Oro is a 18 y.o. female who presents to the ED c/o right lower quadrant pain that awoke her from sleep around noon today.  Reports some nausea but no vomiting no fevers or chills.  Patient was seen at urgent care today and referred to ED for work-up for appendicitis.  No fevers or chills no chest pain or shortness of breath.    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    PAST SURGICAL HISTORY  History reviewed. No pertinent surgical history.      FAMILY HISTORY  Family History   Family history unknown: Yes         SOCIAL HISTORY  Social History     Socioeconomic History    • Marital status: Single   Tobacco Use   • Smoking status: Never   • Smokeless tobacco: Never   Vaping Use   • Vaping Use: Some days   Substance and Sexual Activity   • Alcohol use: No   • Drug use: Yes     Types: Marijuana     Comment: very occasionally         ALLERGIES  Amoxicillin and Penicillins        REVIEW OF SYSTEMS  Review of Systems     All systems reviewed and negative except for those discussed in HPI.       PHYSICAL EXAM    I have reviewed the triage vital signs and nursing notes.    ED Triage Vitals   Temp Heart Rate Resp BP SpO2   04/04/23 2055 04/04/23 2055 04/04/23 2055 04/04/23 2126 04/04/23 2055   98.2 °F (36.8 °C) (!) 136 16 121/77 100 %      Temp src Heart Rate Source Patient Position BP Location FiO2 (%)   04/04/23 2055 -- -- -- --   Tympanic           Physical Exam  GENERAL: alert, no acute distress  SKIN: Warm, dry  HENT: Normocephalic, atraumatic  EYES: no scleral icterus  CV: regular rhythm, regular rate  RESPIRATORY: normal effort, lungs clear  ABDOMEN: soft, patient is tender to palpation over right lower quadrant small mount of guarding no rebound.  Rest of abdominal exam is nontender, nondistended  MUSCULOSKELETAL: no deformity  NEURO: alert, moves all extremities, follows commands          LAB RESULTS  Recent Results (from the past 24 hour(s))   Comprehensive Metabolic Panel    Collection Time: 04/04/23 10:05 PM    Specimen: Blood   Result Value Ref Range    Glucose 98 65 - 99 mg/dL    BUN 12 6 - 20 mg/dL    Creatinine 0.62 0.57 - 1.00 mg/dL    Sodium 136 136 - 145 mmol/L    Potassium 4.2 3.5 - 5.2 mmol/L    Chloride 102 98 - 107 mmol/L    CO2 23.6 22.0 - 29.0 mmol/L    Calcium 9.3 8.6 - 10.5 mg/dL    Total Protein 7.1 6.0 - 8.5 g/dL    Albumin 4.1 3.5 - 5.2 g/dL    ALT (SGPT) 16 1 - 33 U/L    AST (SGOT) 19 1 - 32 U/L    Alkaline Phosphatase 104 39 - 117 U/L    Total Bilirubin 0.5 0.0 - 1.2 mg/dL    Globulin 3.0 gm/dL    A/G Ratio 1.4 g/dL    BUN/Creatinine Ratio 19.4 7.0 - 25.0     Anion Gap 10.4 5.0 - 15.0 mmol/L    eGFR 130.9 >60.0 mL/min/1.73   Lipase    Collection Time: 04/04/23 10:05 PM    Specimen: Blood   Result Value Ref Range    Lipase 21 13 - 60 U/L   hCG, Quantitative, Pregnancy    Collection Time: 04/04/23 10:05 PM    Specimen: Blood   Result Value Ref Range    HCG Quantitative <1.00 mIU/mL   Green Top (Gel)    Collection Time: 04/04/23 10:05 PM   Result Value Ref Range    Extra Tube Hold for add-ons.    Lavender Top    Collection Time: 04/04/23 10:05 PM   Result Value Ref Range    Extra Tube hold for add-on    Gold Top - SST    Collection Time: 04/04/23 10:05 PM   Result Value Ref Range    Extra Tube Hold for add-ons.    Light Blue Top    Collection Time: 04/04/23 10:05 PM   Result Value Ref Range    Extra Tube Hold for add-ons.    CBC Auto Differential    Collection Time: 04/04/23 10:05 PM    Specimen: Blood   Result Value Ref Range    WBC 15.63 (H) 3.40 - 10.80 10*3/mm3    RBC 4.41 3.77 - 5.28 10*6/mm3    Hemoglobin 11.7 (L) 12.0 - 15.9 g/dL    Hematocrit 36.6 34.0 - 46.6 %    MCV 83.0 79.0 - 97.0 fL    MCH 26.5 (L) 26.6 - 33.0 pg    MCHC 32.0 31.5 - 35.7 g/dL    RDW 14.2 12.3 - 15.4 %    RDW-SD 42.5 37.0 - 54.0 fl    MPV 11.0 6.0 - 12.0 fL    Platelets 286 140 - 450 10*3/mm3    Neutrophil % 70.0 42.7 - 76.0 %    Lymphocyte % 21.3 19.6 - 45.3 %    Monocyte % 7.1 5.0 - 12.0 %    Eosinophil % 0.8 0.3 - 6.2 %    Basophil % 0.4 0.0 - 1.5 %    Immature Grans % 0.4 0.0 - 0.5 %    Neutrophils, Absolute 10.95 (H) 1.70 - 7.00 10*3/mm3    Lymphocytes, Absolute 3.33 (H) 0.70 - 3.10 10*3/mm3    Monocytes, Absolute 1.11 (H) 0.10 - 0.90 10*3/mm3    Eosinophils, Absolute 0.12 0.00 - 0.40 10*3/mm3    Basophils, Absolute 0.06 0.00 - 0.20 10*3/mm3    Immature Grans, Absolute 0.06 (H) 0.00 - 0.05 10*3/mm3    nRBC 0.0 0.0 - 0.2 /100 WBC   Urinalysis With Microscopic If Indicated (No Culture) - Urine, Clean Catch    Collection Time: 04/04/23 10:42 PM    Specimen: Urine, Clean Catch   Result Value  Ref Range    Color, UA Yellow Yellow, Straw    Appearance, UA Cloudy (A) Clear    pH, UA 8.0 5.0 - 8.0    Specific Gravity, UA 1.021 1.005 - 1.030    Glucose, UA Negative Negative    Ketones, UA Negative Negative    Bilirubin, UA Negative Negative    Blood, UA Negative Negative    Protein, UA Negative Negative    Leuk Esterase, UA Trace (A) Negative    Nitrite, UA Negative Negative    Urobilinogen, UA 1.0 E.U./dL 0.2 - 1.0 E.U./dL   Urinalysis, Microscopic Only - Urine, Clean Catch    Collection Time: 04/04/23 10:42 PM    Specimen: Urine, Clean Catch   Result Value Ref Range    RBC, UA 0-2 None Seen, 0-2 /HPF    WBC, UA 3-5 (A) None Seen, 0-2 /HPF    Bacteria, UA 1+ (A) None Seen /HPF    Squamous Epithelial Cells, UA 3-6 (A) None Seen, 0-2 /HPF    Hyaline Casts, UA None Seen None Seen /LPF    Amorphous Crystals, UA Moderate/2+ None Seen /HPF    Methodology Manual Light Microscopy        Ordered the above labs and independently reviewed the results.        RADIOLOGY  CT Abdomen Pelvis With Contrast    Result Date: 4/5/2023  CT OF THE ABDOMEN AND PELVIS WITH CONTRAST  HISTORY: Right lower quadrant pain  COMPARISON: None available.  TECHNIQUE: Axial CT imaging was obtained through the abdomen and pelvis. IV contrast was administered.  FINDINGS: Images through the lung bases are clear. The stomach, duodenum, adrenal glands, pancreas, and gallbladder are all appear normal. Spleen is enlarged, measuring 14.2 cm in craniocaudal dimensions. The kidneys enhance symmetrically. There is no hydronephrosis. Uterus is normal. There is a dominant left ovarian cyst, measuring 1.9 x 1.3 cm. There is no bowel obstruction. The patient's appendix is dilated, and there is adjacent periappendiceal soft tissue stranding. Maximum diameter of the appendix is 1 cm. There is adjacent periappendiceal soft tissue stranding. Appearance is characteristic of acute appendicitis. No extraluminal gas or fluid collection is seen to suggest perforation  with abscess formation. There are some prominent adjacent lymph nodes, likely reactive. No acute osseous abnormalities are seen.      Acute appendicitis. No extraluminal gas or fluid collection is seen to suggest perforation abscess formation.  Radiation dose reduction techniques were utilized, including automated exposure control and exposure modulation based on body size.  This report was finalized on 4/5/2023 1:41 AM by Dr. Edith Ivan M.D.        I ordered the above noted radiological studies. Reviewed by me and discussed with radiologist.  See dictation for official radiology interpretation.      PROCEDURES    Procedures      MEDICATIONS GIVEN IN ER    Medications   sodium chloride 0.9 % flush 10 mL (has no administration in time range)   acetaminophen (TYLENOL) tablet 650 mg (has no administration in time range)   oxyCODONE (ROXICODONE) immediate release tablet 5 mg (has no administration in time range)   lactated ringers infusion (75 mL/hr Intravenous New Bag 4/5/23 0510)   sodium chloride 0.9 % bolus 500 mL (0 mL Intravenous Stopped 4/5/23 0045)   morphine injection 2 mg (2 mg Intravenous Given 4/4/23 2322)   ondansetron (ZOFRAN) injection 4 mg (4 mg Intravenous Given 4/4/23 2322)   diatrizoate meglumine-sodium (GASTROGRAFIN) 66-10 % oral solution 30 mL (30 mL Oral Given 4/4/23 2326)   iopamidol (ISOVUE-300) 61 % injection 100 mL (85 mL Intravenous Given 4/5/23 0116)   metroNIDAZOLE (FLAGYL) IVPB 500 mg (500 mg Intravenous New Bag 4/5/23 0226)   levoFLOXacin (LEVAQUIN) 750 mg/150 mL D5W (premix) (LEVAQUIN) 750 mg (0 mg Intravenous Stopped 4/5/23 0225)         ORDERS PLACED DURING THIS VISIT:  Orders Placed This Encounter   Procedures   • CT Abdomen Pelvis With Contrast   • [place] Draw   • Comprehensive Metabolic Panel   • Lipase   • Urinalysis With Microscopic If Indicated (No Culture) - Urine, Clean Catch   • hCG, Quantitative, Pregnancy   • CBC Auto Differential   • Urinalysis, Microscopic Only -  Urine, Clean Catch   • NPO Diet NPO Type: Strict NPO   • Undress & Gown   • Surgery (on-call MD unless specified)   • Insert Peripheral IV   • Inpatient Admission   • CBC & Differential   • Green Top (Gel)   • Lavender Top   • Gold Top - SST   • Light Blue Top         PROGRESS, DATA ANALYSIS, CONSULTS, AND MEDICAL DECISION MAKING    All labs have been independently interpreted by me.  All radiology studies have been reviewed by me and discussed with radiologist dictating the report.   EKG's independently viewed and interpreted by me.  Discussion below represents my analysis of pertinent findings related to patient's condition, differential diagnosis, treatment plan and final disposition.    My differential diagnosis includes but is not limited to:  Gastritis, gastroenteritis, peptic ulcer disease, GERD, esophageal perforation, acute appendicitis, mesenteric adenitis, Meckel’s diverticulum, epiploic appendagitis, diverticulitis, colon cancer, ulcerative colitis, Crohn’s disease, intussusception, small bowel obstruction, adhesions, ischemic bowel, perforated viscus, ileus, obstipation, biliary colic, cholecystitis, cholelithiasis, Enrrique-Satya Luis Alfredo, hepatitis, pancreatitis, common bile duct obstruction, cholangitis, bile leak, splenic trauma, splenic rupture, splenic infarction, splenic abscess, abdominal abscess, ascites, spontaneous bacterial peritonitis, hernia, UTI, cystitis, prostatitis, ureterolithiasis, urinary obstruction, ovarian cyst, torsion, pregnancy, ectopic pregnancy, PID, pelvic abscess, mittelschmerz, endometriosis, AAA, myocardial infarction, pneumonia, cancer, porphyria, DKA, medications, sickle cell, viral syndrome, zoster      ED Course as of 04/05/23 0656   Wed Apr 05, 2023   0016 WBC(!): 15.63 [TJ]   0016 HCG Quantitative: <1.00 [TJ]   0016 Lipase: 21 [TJ]   0016 Creatinine: 0.62 [TJ]   0016 WBC, UA(!): 3-5 [TJ]   0150 I have independently reviewed patient's CT abdomen pelvis; my interpretation  is periappendiceal stranding.  No obvious free air. [TJ]   0200 Patient with acute appendicitis.  Will admit for surgery. [TJ]      ED Course User Index  [TJ] Lc Perry MD       I interpreted the cardiac monitor rhythm and my independent interpretation is: normal sinus rhythm.     PPE: The patient wore a mask and I wore an N95 mask throughout the entire patient encounter.       AS OF 06:56 EDT VITALS:    BP - 97/60  HR - 96  TEMP - 97.1 °F (36.2 °C) (Oral)  O2 SATS - 98%        DIAGNOSIS  Final diagnoses:   Acute appendicitis, unspecified acute appendicitis type         DISPOSITION  ED Disposition     ED Disposition   Decision to Admit    Condition   --    Comment   Level of Care: Med/Surg [1]   Diagnosis: Acute appendicitis, unspecified acute appendicitis type [[id number]]   Admitting Physician: KAROL MIGUEL [[id number]]   Certification: I Certify That Inpatient Hospital Services Are Medically Necessary For Greater Than 2 Midnights                  Note Disclaimer: At [facility], we believe that sharing information builds trust and better relationships. You are receiving this note because you recently visited [facility]. It is possible you will see health information before a provider has talked with you about it. This kind of information can be easy to misunderstand. To help you fully understand what it means for your health, we urge you to discuss this note with your provider.       Lc Perry MD  04/05/23 0656

## 2023-04-06 ENCOUNTER — TRANSITIONAL CARE MANAGEMENT TELEPHONE ENCOUNTER (OUTPATIENT)
Dept: CALL CENTER | Facility: HOSPITAL | Age: 18
End: 2023-04-06
Payer: COMMERCIAL

## 2023-04-06 NOTE — OUTREACH NOTE
Call Center TCM Note    Flowsheet Row Responses   [facility] patient discharged from? [facility]   Does the patient have one of the following disease processes/diagnoses(primary or secondary)? General Surgery   TCM attempt successful? Yes   Call start time 1204   Call end time 1208   Discharge diagnosis Acute appendicitis,   Laparoscopic Appendectomy   Does the patient have all medications related to this admission filled (includes all antibiotics, pain medications, etc.) Yes   Is the patient taking all medications as directed (includes completed medication regime)? Yes   Does the patient have an appointment with their PCP within 7 days of discharge? No   Nursing Interventions Patient desires to follow up with specialty only   Has home health visited the patient within 72 hours of discharge? N/A   Psychosocial issues? No   Did the patient receive a copy of their discharge instructions? Yes   What is the patient's perception of their health status since discharge? Improving   Is the patient/caregiver able to teach back the hierarchy of who to call/visit for symptoms/problems? PCP, Specialist, Home health nurse, Urgent Care, ED, 911 Yes   TCM call completed? Yes   Wrap up additional comments Quick call, doing ok, she will be following up with her surgeon.   Call end time 1208   Would this patient benefit from a Referral to Amb Social Work? No   Is the patient interested in additional calls from an ambulatory ?  NOTE:  applies to high risk patients requiring additional follow-up. No          Meredith Ramirez RN    4/6/2023, 12:08 EDT

## 2023-04-06 NOTE — DISCHARGE SUMMARY
Admission date: 4/4/2023   Discharge date: 4/5/2023  8:39 PM     Admission diagnosis: Acute appendicitis, unspecified acute appendicitis type [K35.80]     Discharge diagnosis: Acute appendicitis     Procedure: Laparoscopic appendectomy    Hospital course: Patient admitted with a diagnosis of appendicitis.  She underwent laparoscopic appendectomy.  Postoperative course was uneventful and patient was discharged on postoperative day 0.     Meds:      Your medication list      START taking these medications      Instructions Last Dose Given Next Dose Due   levoFLOXacin 500 MG tablet  Commonly known as: Levaquin      Take 1 tablet by mouth Daily for 4 days.       metroNIDAZOLE 500 MG tablet  Commonly known as: Flagyl      Take 1 tablet by mouth 3 (Three) Times a Day for 4 days.       ondansetron 4 MG tablet  Commonly known as: Zofran      Take 1 tablet by mouth Every 8 (Eight) Hours As Needed for Nausea or Vomiting.       oxyCODONE-acetaminophen 5-325 MG per tablet  Commonly known as: PERCOCET      Take 1 tablet by mouth Every 6 (Six) Hours As Needed (Pain).       Senexon-S 8.6-50 MG per tablet  Generic drug: sennosides-docusate      Take 1 tablet by mouth Daily.          CONTINUE taking these medications      Instructions Last Dose Given Next Dose Due   desogestrel-ethinyl estradiol 0.15-30 MG-MCG per tablet  Commonly known as: Desogen      Take 1 tablet by mouth Daily.       Humira Pen-Psor/Uveit Starter 80 MG/0.8ML & 40MG/0.4ML Pen-injector Kit  Generic drug: Adalimumab           Humira Pen 40 MG/0.4ML Pen-injector Kit  Generic drug: Adalimumab           ipratropium 0.06 % nasal spray  Commonly known as: ATROVENT      2 sprays into the nostril(s) as directed by provider 4 (Four) Times a Day.       mupirocin 2 % ointment  Commonly known as: BACTROBAN      Apply 1 application topically to the appropriate area as directed 2 (Two) Times a Day As Needed (lesion/rash).       naproxen 500 MG tablet  Commonly known as:  Naprosyn      Take 1 tablet by mouth 2 (Two) Times a Day With Meals.       Nexplanon 68 MG implant subdermal implant  Generic drug: Etonogestrel           valACYclovir 1000 MG tablet  Commonly known as: Valtrex      Take 1 tablet by mouth Daily.       VITAMIN D PO      Take  by mouth.             Where to Get Your Medications      These medications were sent to [place] [id number]    Hours: 7:00 AM-6:00 PM Mon-Fri, 8:00 AM-4:30 PM Sat-Sun (Closed 12-12:30PM) Phone: [phone]   · levoFLOXacin 500 MG tablet  · metroNIDAZOLE 500 MG tablet  · ondansetron 4 MG tablet  · oxyCODONE-acetaminophen 5-325 MG per tablet  · Senexon-S 8.6-50 MG per tablet         Instructions:    - No heavy lifting of more than 15 lb for 6 weeks. Can start doing aerobic type exercise in 4 weeks.   - No driving while taking pain medications  - Take medications as prescribed.   - Can shower, no bath tub    Follow up: Dr. Bagley in 2 weeks, call for appointment      Robert Bagley MD  General, Minimally Invasive and Endoscopic Surgery  [facility]

## 2023-04-06 NOTE — OUTREACH NOTE
Prep Survey    Flowsheet Row Responses   [facility] patient discharged from? [place]   Is LACE score < 7 ? Yes   Eligibility [facility]   Date of Admission 04/04/23   Date of Discharge 04/05/23   Discharge Disposition Home or Self Care   Discharge diagnosis Acute appendicitis,   Laparoscopic Appendectomy   Does the patient have one of the following disease processes/diagnoses(primary or secondary)? General Surgery   Does the patient have Home health ordered? No   Is there a DME ordered? No   Prep survey completed? Yes          Karolyn MEDINA - Registered Nurse

## 2023-04-06 NOTE — PROGRESS NOTES
Case Management Discharge Note      Final Note: Discharged home. Adore Mckinney, JERAMIE              Transportation Services  Private: Car    Final Discharge Disposition Code: 01 - home or self-care

## 2023-04-07 LAB
LAB AP CASE REPORT: NORMAL
PATH REPORT.FINAL DX SPEC: NORMAL
PATH REPORT.GROSS SPEC: NORMAL

## 2023-04-21 ENCOUNTER — OFFICE VISIT (OUTPATIENT)
Dept: SURGERY | Facility: CLINIC | Age: 18
End: 2023-04-21
Payer: COMMERCIAL

## 2023-04-21 DIAGNOSIS — Z09 ENCOUNTER FOR FOLLOW-UP: Primary | ICD-10-CM

## 2023-04-21 PROCEDURE — 99024 POSTOP FOLLOW-UP VISIT: CPT | Performed by: PHYSICIAN ASSISTANT

## 2023-04-21 NOTE — LETTER
April 21, 2023     Patient: Gloria Oro   YOB: 2005   Date of Visit: 4/21/2023       To Whom It May Concern:    It is my medical opinion that Gloria Oro may return to work on May 5, 2023 without restrictions .           Sincerely,        Demetrius Campos PA-C    CC:   No Recipients

## 2023-04-21 NOTE — PROGRESS NOTES
CC:  Postop follow-up    S:  This is a 18-year-old lady returning to the office today status post laparoscopic appendectomy that was performed on 4/5/2023.  Overall she is doing very well since surgery with her only complaint being sore muscles when stretching.  The symptoms that she was having prior to surgery have all resolved.  She denies having nausea, vomiting, abdominal distention, abdominal pain, changes to her bowel habits or any other complaints.    O:  All 3 incisions are healing very well with the glue having peeled off the majority of these incisions.  She was informed the remainder the glue will continue to peel off.  Abdomen is soft, nontender, nondistended    A/P:  Pathology was reviewed with her which indicated that she had moderate to marked acute appendicitis with serositis and periappendicitis.  I encouraged her to continue to return to her normal activities using her body as her guide.  I recommended that she not lift greater than 20 pounds until she is a full 4 weeks out from surgery.  I did write her a return to work note for 5/5/2023 without restrictions.  All questions were answered and she was willing to proceed with all recommendations.    Demetrius Campos PA-C

## 2023-08-03 ENCOUNTER — TELEPHONE (OUTPATIENT)
Dept: FAMILY MEDICINE CLINIC | Facility: CLINIC | Age: 18
End: 2023-08-03

## 2023-08-03 ENCOUNTER — TELEMEDICINE (OUTPATIENT)
Dept: FAMILY MEDICINE CLINIC | Facility: CLINIC | Age: 18
End: 2023-08-03
Payer: COMMERCIAL

## 2023-08-03 DIAGNOSIS — Z30.09 GENERAL COUNSELING AND ADVICE FOR CONTRACEPTIVE MANAGEMENT: Primary | ICD-10-CM

## 2023-08-03 PROCEDURE — 99212 OFFICE O/P EST SF 10 MIN: CPT | Performed by: FAMILY MEDICINE

## 2023-08-10 ENCOUNTER — TELEPHONE (OUTPATIENT)
Dept: FAMILY MEDICINE CLINIC | Facility: CLINIC | Age: 18
End: 2023-08-10

## 2023-08-10 NOTE — TELEPHONE ENCOUNTER
Caller: Gloria Oro    Relationship to patient: Self    Best call back number: [phone]    Patient is needing: SHE IS NOT ABLE TO GET BY TO SIGN THE PAPERWORK FOR THE NEXPLANON TILL NEXT WEEK.  CAN YOU SEND IT TO HER ELECTRONICALLY OR SOMETHING SO SHE CAN GO AHEAD AND SIGN IT?

## 2023-11-06 ENCOUNTER — TELEPHONE (OUTPATIENT)
Dept: FAMILY MEDICINE CLINIC | Facility: CLINIC | Age: 18
End: 2023-11-06
Payer: COMMERCIAL

## 2023-11-07 ENCOUNTER — LAB (OUTPATIENT)
Dept: LAB | Facility: HOSPITAL | Age: 18
End: 2023-11-07
Payer: COMMERCIAL

## 2023-11-07 ENCOUNTER — TRANSCRIBE ORDERS (OUTPATIENT)
Dept: LAB | Facility: HOSPITAL | Age: 18
End: 2023-11-07
Payer: COMMERCIAL

## 2023-11-07 DIAGNOSIS — Z79.899 ENCOUNTER FOR LONG-TERM (CURRENT) USE OF OTHER MEDICATIONS: ICD-10-CM

## 2023-11-07 DIAGNOSIS — Z79.899 ENCOUNTER FOR LONG-TERM (CURRENT) USE OF OTHER MEDICATIONS: Primary | ICD-10-CM

## 2023-11-07 LAB
ALBUMIN SERPL-MCNC: 4.5 G/DL (ref 3.5–5.2)
ALBUMIN/GLOB SERPL: 1.8 G/DL
ALP SERPL-CCNC: 87 U/L (ref 39–117)
ALT SERPL W P-5'-P-CCNC: 13 U/L (ref 1–33)
ANION GAP SERPL CALCULATED.3IONS-SCNC: 10.6 MMOL/L (ref 5–15)
AST SERPL-CCNC: 20 U/L (ref 1–32)
BASOPHILS # BLD AUTO: 0.06 10*3/MM3 (ref 0–0.2)
BASOPHILS NFR BLD AUTO: 0.5 % (ref 0–1.5)
BILIRUB SERPL-MCNC: 0.3 MG/DL (ref 0–1.2)
BUN SERPL-MCNC: 18 MG/DL (ref 6–20)
BUN/CREAT SERPL: 21.7 (ref 7–25)
CALCIUM SPEC-SCNC: 10.7 MG/DL (ref 8.6–10.5)
CHLORIDE SERPL-SCNC: 105 MMOL/L (ref 98–107)
CO2 SERPL-SCNC: 21.4 MMOL/L (ref 22–29)
CREAT SERPL-MCNC: 0.83 MG/DL (ref 0.57–1)
DEPRECATED RDW RBC AUTO: 39.7 FL (ref 37–54)
EGFRCR SERPLBLD CKD-EPI 2021: 103.6 ML/MIN/1.73
EOSINOPHIL # BLD AUTO: 0.19 10*3/MM3 (ref 0–0.4)
EOSINOPHIL NFR BLD AUTO: 1.7 % (ref 0.3–6.2)
ERYTHROCYTE [DISTWIDTH] IN BLOOD BY AUTOMATED COUNT: 14 % (ref 12.3–15.4)
GLOBULIN UR ELPH-MCNC: 2.5 GM/DL
GLUCOSE SERPL-MCNC: 103 MG/DL (ref 65–99)
HCT VFR BLD AUTO: 35.1 % (ref 34–46.6)
HGB BLD-MCNC: 10.8 G/DL (ref 12–15.9)
IMM GRANULOCYTES # BLD AUTO: 0.02 10*3/MM3 (ref 0–0.05)
IMM GRANULOCYTES NFR BLD AUTO: 0.2 % (ref 0–0.5)
LYMPHOCYTES # BLD AUTO: 3.88 10*3/MM3 (ref 0.7–3.1)
LYMPHOCYTES NFR BLD AUTO: 35.4 % (ref 19.6–45.3)
MCH RBC QN AUTO: 24.3 PG (ref 26.6–33)
MCHC RBC AUTO-ENTMCNC: 30.8 G/DL (ref 31.5–35.7)
MCV RBC AUTO: 79.1 FL (ref 79–97)
MONOCYTES # BLD AUTO: 0.69 10*3/MM3 (ref 0.1–0.9)
MONOCYTES NFR BLD AUTO: 6.3 % (ref 5–12)
NEUTROPHILS NFR BLD AUTO: 55.9 % (ref 42.7–76)
NEUTROPHILS NFR BLD AUTO: 6.11 10*3/MM3 (ref 1.7–7)
NRBC BLD AUTO-RTO: 0 /100 WBC (ref 0–0.2)
PLATELET # BLD AUTO: 314 10*3/MM3 (ref 140–450)
PMV BLD AUTO: 11.3 FL (ref 6–12)
POTASSIUM SERPL-SCNC: 4.3 MMOL/L (ref 3.5–5.2)
PROT SERPL-MCNC: 7 G/DL (ref 6–8.5)
RBC # BLD AUTO: 4.44 10*6/MM3 (ref 3.77–5.28)
SODIUM SERPL-SCNC: 137 MMOL/L (ref 136–145)
WBC NRBC COR # BLD: 10.95 10*3/MM3 (ref 3.4–10.8)

## 2023-11-07 PROCEDURE — 80053 COMPREHEN METABOLIC PANEL: CPT

## 2023-11-07 PROCEDURE — 85025 COMPLETE CBC W/AUTO DIFF WBC: CPT

## 2023-11-07 PROCEDURE — 36415 COLL VENOUS BLD VENIPUNCTURE: CPT

## 2023-11-15 ENCOUNTER — PROCEDURE VISIT (OUTPATIENT)
Dept: FAMILY MEDICINE CLINIC | Facility: CLINIC | Age: 18
End: 2023-11-15
Payer: COMMERCIAL

## 2023-11-15 VITALS
HEIGHT: 63 IN | SYSTOLIC BLOOD PRESSURE: 112 MMHG | BODY MASS INDEX: 25.12 KG/M2 | TEMPERATURE: 98.3 F | OXYGEN SATURATION: 99 % | WEIGHT: 141.8 LBS | HEART RATE: 81 BPM | DIASTOLIC BLOOD PRESSURE: 60 MMHG

## 2023-11-15 DIAGNOSIS — Z30.46 NEXPLANON REMOVAL: ICD-10-CM

## 2023-11-15 DIAGNOSIS — Z30.09 GENERAL COUNSELING AND ADVICE FOR CONTRACEPTIVE MANAGEMENT: Primary | ICD-10-CM

## 2023-11-15 DIAGNOSIS — Z30.017 INSERTION OF IMPLANTABLE SUBDERMAL CONTRACEPTIVE: ICD-10-CM

## 2023-11-15 LAB
B-HCG UR QL: NEGATIVE
EXPIRATION DATE: NORMAL
INTERNAL NEGATIVE CONTROL: NEGATIVE
INTERNAL POSITIVE CONTROL: POSITIVE
Lab: NORMAL

## 2023-11-15 NOTE — PROGRESS NOTES
Procedures  Nexplanon Removal and Re-insertion Procedure Note    Removal    Date of Insertion:  May 4, 2020  Date of Removal:  November 15, 2023    Information related to removal of the implant:   Reason(s) for removal:  Product life completed  Was implant palpable before removal?  Yes    Procedure Time Out Documentation  The risks of the procedure were reviewed with the patient including bleeding, infection and unlikely damage to the insertion site and the benefits of the procedure were explained to the patient and Written informed consent was obtained    Procedure:    Implant identified.  Left upper arm prepped with Hibiclens-Alcoholx3.  None, 2% lidocaine injected at planned incision site.  A vertical incision 2 mm was performed with scalpel at the distal end of implant.  The implant was removed using a hemostat. The implant was inspected and found to be intact and complete.  Steri strips and a pressure dressing were applied to the site.  After removal instructions were given and verbally reviewed with the patient who acknowledged her understanding.    Difficulties with the implant removal procedure?  no    Patient tolerated the procedure well without complications.    Insertion    Gloria Oro desires replacement of a subdermal etonogestrel contraceptive implant insertion.  She has been counseled regarding the risks, benefits and alternatives to the implant.  She especially understands that her menstrual periods are expected to become irregular and unpredictable throughout the time she is using the implant.  She has no contraindications to the insertion.  Her questions have been answered.  She has fully reviewed the FDA-approved consent brochure, has signed the consent form, and wishes to proceed with the insertion today.     Current method of contraception: Nexplanon     No LMP recorded.    Urine pregnancy test: negative    Procedure Time Out Documentation       Procedure Details  The contraceptive jacob was  inserted according to the 's instructions without complications in the region where the previous implant was removed.  The jacob was palpable under the skin after the insertion.  The insertion site was closed with Steri-strip and a pressure dressing was applied.    Lot:  [id number]  Exp:  10/26/2025    Gloria was given post-insertion instructions.  She understands that the implant must be removed at the end of three years and may be removed sooner if she wishes.    Successful insertion of nexplanon device.    Patient tolerated the procedure well without complications.

## 2023-12-14 ENCOUNTER — OFFICE VISIT (OUTPATIENT)
Dept: FAMILY MEDICINE CLINIC | Facility: CLINIC | Age: 18
End: 2023-12-14
Payer: COMMERCIAL

## 2023-12-14 VITALS
SYSTOLIC BLOOD PRESSURE: 114 MMHG | DIASTOLIC BLOOD PRESSURE: 62 MMHG | BODY MASS INDEX: 23.96 KG/M2 | TEMPERATURE: 98 F | HEART RATE: 75 BPM | HEIGHT: 63 IN | WEIGHT: 135.2 LBS | OXYGEN SATURATION: 99 %

## 2023-12-14 DIAGNOSIS — B00.9 HSV INFECTION: Primary | ICD-10-CM

## 2023-12-14 DIAGNOSIS — L40.1 IMPETIGO HERPETIFORMIS: ICD-10-CM

## 2023-12-14 DIAGNOSIS — D84.9 IMMUNOCOMPROMISED: ICD-10-CM

## 2023-12-14 PROCEDURE — 99213 OFFICE O/P EST LOW 20 MIN: CPT | Performed by: FAMILY MEDICINE

## 2023-12-14 RX ORDER — VALACYCLOVIR HYDROCHLORIDE 1 G/1
1000 TABLET, FILM COATED ORAL DAILY
Qty: 90 TABLET | Refills: 3 | Status: SHIPPED | OUTPATIENT
Start: 2023-12-14

## 2023-12-14 NOTE — PROGRESS NOTES
"Chief Complaint  Med Refill (Having vaginal herpes outbreak /On menses)    Subjective        Gloria Oro presents to [facility]  History of Present Illness  Patient presents to discuss her genital herpes.  She has been breaking out now for several weeks.  It will heal and come back.  She has been taking her Valtrex daily again for a few days but had not been taking it every day for prevention like we have discussed before when she had impetigo herpetiformis.    She stated the mupirocin helped cleared up last time very well.  She is still immunocompromised from her Humira for her psoriasis.        Objective   Vital Signs:  /62   Pulse 75   Temp 98 °F (36.7 °C)   Ht 160 cm (63\")   Wt 61.3 kg (135 lb 3.2 oz)   SpO2 99%   BMI 23.95 kg/m²   Estimated body mass index is 23.95 kg/m² as calculated from the following:    Height as of this encounter: 160 cm (63\").    Weight as of this encounter: 61.3 kg (135 lb 3.2 oz).  Facility age limit for growth %wolf is 20 years.    BMI is within normal parameters. No other follow-up for BMI required.      Physical Exam  Constitutional:       General: She is not in acute distress.     Appearance: Normal appearance. She is well-developed.   HENT:      Head: Normocephalic and atraumatic.      Right Ear: External ear normal.      Left Ear: External ear normal.   Eyes:      Conjunctiva/sclera: Conjunctivae normal.      Pupils: Pupils are equal, round, and reactive to light.   Neck:      Thyroid: No thyromegaly.   Pulmonary:      Effort: Pulmonary effort is normal.   Genitourinary:     Comments: Erythematous areas with some scaling on labia majora and mons  Neurological:      Mental Status: She is alert and oriented to person, place, and time.   Psychiatric:         Mood and Affect: Mood normal.         Behavior: Behavior normal.        Result Review :                   Assessment and Plan   Diagnoses and all orders for this visit:    1. HSV " infection (Primary)  -     valACYclovir (Valtrex) 1000 MG tablet; Take 1 tablet by mouth Daily.  Dispense: 90 tablet; Refill: 3    2. Immunocompromised  -     valACYclovir (Valtrex) 1000 MG tablet; Take 1 tablet by mouth Daily.  Dispense: 90 tablet; Refill: 3    3. Impetigo herpetiformis  -     mupirocin (BACTROBAN) 2 % ointment; Apply 1 application  topically to the appropriate area as directed 3 (Three) Times a Day As Needed (infection).  Dispense: 30 g; Refill: 2    HSV infection and immunocompromise patient with history of recurrent impetigo high-performance-we will have her keep some Bactroban at home and use that as needed-get back on the valacyclovir every day for prevention.  Let me know if this does not clear up.         Follow Up   Return in about 6 months (around 6/14/2024) for Annual physical.  Patient was given instructions and counseling regarding her condition or for health maintenance advice. Please see specific information pulled into the AVS if appropriate.

## 2024-03-18 ENCOUNTER — TELEMEDICINE (OUTPATIENT)
Dept: FAMILY MEDICINE CLINIC | Facility: CLINIC | Age: 19
End: 2024-03-18
Payer: COMMERCIAL

## 2024-03-18 VITALS — WEIGHT: 135 LBS | HEIGHT: 63 IN | BODY MASS INDEX: 23.92 KG/M2

## 2024-03-18 DIAGNOSIS — R11.2 NAUSEA AND VOMITING, UNSPECIFIED VOMITING TYPE: Primary | ICD-10-CM

## 2024-03-18 PROCEDURE — 99213 OFFICE O/P EST LOW 20 MIN: CPT | Performed by: FAMILY MEDICINE

## 2024-03-18 NOTE — PROGRESS NOTES
"Chief Complaint  STOMACH ISSUE (\"A FEW YEARS AGO  WENT THROUGH AN EXTREME WEIGHT LOSS\"/NAUSEA DURING EATING )    Subjective           Gloria Oro presents to [facility]  History of Present Illness  Patient presents for a video visit.  Feels her stomach issues never fully went away.  Had vomiting,  nausea and weight loss a few years.   Now getting nauseated with her meals again.  Has not vomited.  Can happen while she is eating or after.  Can be up to an hour after.  No weight loss.  No change is bowels.  Can happen with any type of food.        Objective   Vital Signs:   Ht 160 cm (62.99\")   Wt 61.2 kg (135 lb)   BMI 23.92 kg/m²     Estimated body mass index is 23.92 kg/m² as calculated from the following:    Height as of this encounter: 160 cm (62.99\").    Weight as of this encounter: 61.2 kg (135 lb).     Physical Exam   Constitutional: She appears well-developed and well-nourished. No distress.   HENT:   Head: Normocephalic and atraumatic.   Eyes: Conjunctivae are normal.   Psychiatric: She has a normal mood and affect.     Result Review :                   Assessment and Plan      Diagnoses and all orders for this visit:    1. Nausea and vomiting, unspecified vomiting type (Primary)  -     US Gallbladder; Future    Nausea and vomiting after eating-we will get a gallbladder ultrasound and follow-up after results, to ER for severe symptoms, follow-up in person if needed         No follow-ups on file.  Patient was given instructions and counseling regarding her condition or for health maintenance advice. Please see specific information pulled into the AVS if appropriate.     Mode of Visit: Video  Location of patient: home  Location of provider: [facility] [place] [facility]  You have chosen to receive care through a telehealth visit.  The patient has signed the video visit consent form.  The visit included audio and video interaction. No technical issues occurred during this visit.   "

## 2024-04-09 ENCOUNTER — HOSPITAL ENCOUNTER (OUTPATIENT)
Dept: ULTRASOUND IMAGING | Facility: HOSPITAL | Age: 19
Discharge: HOME | End: 2024-04-09
Admitting: FAMILY MEDICINE
Payer: COMMERCIAL

## 2024-04-09 DIAGNOSIS — R11.2 NAUSEA AND VOMITING, UNSPECIFIED VOMITING TYPE: ICD-10-CM

## 2024-04-09 PROCEDURE — 76705 ECHO EXAM OF ABDOMEN: CPT

## 2024-10-23 ENCOUNTER — TELEPHONE (OUTPATIENT)
Dept: FAMILY MEDICINE CLINIC | Facility: CLINIC | Age: 19
End: 2024-10-23

## 2024-10-23 ENCOUNTER — OFFICE VISIT (OUTPATIENT)
Dept: FAMILY MEDICINE CLINIC | Facility: CLINIC | Age: 19
End: 2024-10-23
Payer: COMMERCIAL

## 2024-10-23 VITALS
WEIGHT: 157 LBS | HEART RATE: 100 BPM | TEMPERATURE: 97.9 F | DIASTOLIC BLOOD PRESSURE: 64 MMHG | BODY MASS INDEX: 27.82 KG/M2 | HEIGHT: 63 IN | SYSTOLIC BLOOD PRESSURE: 108 MMHG | OXYGEN SATURATION: 99 %

## 2024-10-23 DIAGNOSIS — Z30.46 NEXPLANON REMOVAL: Primary | ICD-10-CM

## 2024-10-23 NOTE — TELEPHONE ENCOUNTER
Tried to call patient to discuss today's appointment she scheduled via [employer].  There was no answer and voicemail was not set up.

## 2024-10-23 NOTE — PROGRESS NOTES
Procedures  Nexplanon Removal    Date of Insertion:  known, 11/15/2023  Date of Removal:  October 23, 2024    Information related to removal of the implant:   Reason(s) for removal:  Irregular bleeding  Was implant palpable before removal?  Yes    Procedure Time Out Documentation  The risks of the procedure were reviewed with the patient including bleeding, infection and unlikely damage to the uterus and the benefits of the procedure were explained to the patient and Written informed consent was obtained      Procedure:    Implant identified.  Left upper arm prepped with Hibiclens-Alcoholx3.  None, 1% lidocaine injected at planned incision site.      A vertical incision 3 mm was performed with scalpel at the distal end of implant.  The implant was removed using  an additional extension of the incision to a T and then sharp dissection of the scar capsule and the implant was removed with a hemostat . The implant was inspected and found to be intact and complete.  Wound was closed with two 4-0 nylon sutures and a dressing placed.  After removal instructions were given and verbally reviewed with the patient who acknowledged her understanding.    Difficulties with the implant removal procedure?  yes; Significant fibrosis    Patient tolerated the procedure well without complications.    Meredith Lea Kehrer, MD  10/23/2024  16:44 EDT      Lengthy discussion with the patient prior to removal.  She was tired of heavy bleeding.  Has been feeling weak and tired.  He went to the ER couple weeks ago.  All her workup was normal.  I have asked her to come back in in 10 days for suture removal and follow up with a physical in 3 months to see how her bleeding is afterward.  We discussed using condoms as well as Plan B and other options including a copper IUD since she does not want hormones.

## 2024-10-23 NOTE — TELEPHONE ENCOUNTER
----- Message from Meredith Kehrer sent at 10/23/2024  7:50 AM EDT -----  Patient is on my schedule for birth control removal.  She just had that Nexplanon placed last year.  Please call her and see what is going on.  Also, procedures need to be a 30-minute slot.

## 2024-10-23 NOTE — PROGRESS NOTES
"Chief Complaint  wants birth contril removed     Subjective    {Problem List  Visit Diagnosis   Encounters  Notes  Medications  Labs  Result Review Imaging  Media :23}    Gloria Oro presents to [facility]  History of Present Illness  History of Present Illness  The patient is a 19-year-old female who presents to discuss removing her Nexplanon because of continuous spotting and fatigue.    She reports experiencing heavy menstrual bleeding, with only 4 to 5 weeks in a year without menstruation. This constant bleeding causes discomfort and irritation, similar to the sensation of using a tampon or pad. She has not tried using a Diva cup. She expresses a desire to avoid any hormonal treatments. Prior to the insertion of Nexplanon, her menstrual cycles were regular. She is also due for a cervical cancer screening.       Objective   Vital Signs:  /64   Pulse 100   Temp 97.9 °F (36.6 °C)   Ht 160 cm (63\")   Wt 71.2 kg (157 lb)   SpO2 99%   BMI 27.81 kg/m²   Estimated body mass index is 27.81 kg/m² as calculated from the following:    Height as of this encounter: 160 cm (63\").    Weight as of this encounter: 71.2 kg (157 lb).       {BMI is >= 25 and <30. (Overweight) The following options were offered after discussion; (Optional):84251}      Physical Exam  Constitutional:       General: She is not in acute distress.     Appearance: Normal appearance. She is well-developed.   HENT:      Head: Normocephalic and atraumatic.      Right Ear: External ear normal.      Left Ear: External ear normal.   Eyes:      Conjunctiva/sclera: Conjunctivae normal.      Pupils: Pupils are equal, round, and reactive to light.   Neck:      Thyroid: No thyromegaly.   Pulmonary:      Effort: Pulmonary effort is normal.   Neurological:      Mental Status: She is alert and oriented to person, place, and time.   Psychiatric:         Mood and Affect: Mood normal.         Behavior: Behavior normal. "        Physical Exam          Result Review :{Labs  Result Review  Imaging  Med Tab  Media  Procedures :23}    {The following data was reviewed by (Optional):12858}  {Ambulatory Labs (Optional):02676}  {Data reviewed (Optional):70315:::1}  Results  Laboratory Studies  Blood count was normal, not anemic.              Assessment and Plan {CC Problem List  Visit Diagnosis   ROS  Review (Popup)  Health Maintenance  Quality  BestPractice  Medications  SmartSets  SnapShot Encounters  Media :23}    There are no diagnoses linked to this encounter.  Assessment & Plan  1. Nexplanon removal.  She reports continuous heavy bleeding, fatigue, and vaginal irritation due to the Nexplanon. Despite a recent ER visit showing normal blood counts and no anemia, she experiences significant discomfort. The Nexplanon will be removed today. She was advised to consider using a menstrual cup to manage her menstrual flow. She expressed a preference for non-hormonal birth control options and was informed about the copper IUD, although it may not regulate her periods immediately. She was also reminded of the need for contraception if she becomes sexually active.  Follow-up in 10 days for suture removal.    2. Health Maintenance.  She is overdue for a physical exam and a cervical cancer screening (Pap smear). She is advised to schedule a physical in 3 months to discuss her bleeding and overall health.    Follow-up  Return in 3 months for follow up.          {Time Spent (Optional):42508}  Follow Up {Instructions Charge Capture  Follow-up Communications :23}    Return in about 3 months (around 1/23/2025) for Annual physical.  Patient was given instructions and counseling regarding her condition or for health maintenance advice. Please see specific information pulled into the AVS if appropriate.    Patient or patient representative verbalized consent for the use of Ambient Listening during the visit with  Meredith Lea Kehrer, MD  for chart documentation. 10/23/2024  16:33 EDT

## 2024-11-04 ENCOUNTER — OFFICE VISIT (OUTPATIENT)
Dept: FAMILY MEDICINE CLINIC | Facility: CLINIC | Age: 19
End: 2024-11-04
Payer: COMMERCIAL

## 2024-11-04 VITALS
TEMPERATURE: 97.2 F | OXYGEN SATURATION: 99 % | BODY MASS INDEX: 28.14 KG/M2 | SYSTOLIC BLOOD PRESSURE: 106 MMHG | DIASTOLIC BLOOD PRESSURE: 60 MMHG | WEIGHT: 158.8 LBS | HEIGHT: 63 IN | HEART RATE: 104 BPM

## 2024-11-04 DIAGNOSIS — Z30.46 NEXPLANON REMOVAL: Primary | ICD-10-CM

## 2024-11-04 PROCEDURE — 99024 POSTOP FOLLOW-UP VISIT: CPT | Performed by: FAMILY MEDICINE

## 2024-11-04 NOTE — PROGRESS NOTES
"Chief Complaint  Suture / Staple Removal    Subjective        Gloria Oro presents to [facility]  History of Present Illness  History of Present Illness  Patient presents to have her suture remover from the extension of her incision to remove her Nexplanon.  She has no complaints.  It has been 10 days.  The area is healing well.  She still does not want any contraception.       Objective   Vital Signs:  /60   Pulse 104   Temp 97.2 °F (36.2 °C)   Ht 160 cm (63\")   Wt 72 kg (158 lb 12.8 oz)   SpO2 99%   BMI 28.13 kg/m²   Estimated body mass index is 28.13 kg/m² as calculated from the following:    Height as of this encounter: 160 cm (63\").    Weight as of this encounter: 72 kg (158 lb 12.8 oz).             Physical Exam   Physical Exam  Inner left upper arm well-healed.  2 sutures removed without difficulty.  Bandage placed at patient's request.  No bleeding.       Result Review :          Results                Assessment and Plan     Diagnoses and all orders for this visit:    1. Nexplanon removal (Primary)    Follow-up as needed.  Patient defers any discussion on contraceptives at this time.  Assessment & Plan              Follow Up     No follow-ups on file.  Patient was given instructions and counseling regarding her condition or for health maintenance advice. Please see specific information pulled into the AVS if appropriate.    Patient or patient representative verbalized consent for the use of Ambient Listening during the visit with  Meredith Lea Kehrer, MD for chart documentation. 11/4/2024  11:30 EST      "

## (undated) DEVICE — 2, DISPOSABLE SUCTION/IRRIGATOR WITH DISPOSABLE TIP: Brand: STRYKEFLOW

## (undated) DEVICE — SUT VIC 0/0 UR6 27IN DYED J603H

## (undated) DEVICE — GLV SURG BIOGEL LTX PF 7 1/2

## (undated) DEVICE — ENDOPOUCH RETRIEVER SPECIMEN RETRIEVAL BAGS: Brand: ENDOPOUCH RETRIEVER

## (undated) DEVICE — ECHELON FLEX45 ENDOPATH STAPLER, ARTICULATING ENDOSCOPIC LINEAR CUTTER (NO CARTRIDGE): Brand: ECHELON ENDOPATH

## (undated) DEVICE — ENDOPATH XCEL BLADELESS TROCARS WITH STABILITY SLEEVES: Brand: ENDOPATH XCEL

## (undated) DEVICE — DISPOSABLE MONOPOLAR ENDOSCOPIC CORD 10 FT. (3M): Brand: KIRWAN

## (undated) DEVICE — ENDOPATH XCEL UNIVERSAL TROCAR STABLILITY SLEEVES: Brand: ENDOPATH XCEL

## (undated) DEVICE — LAPAROSCOPIC SMOKE FILTRATION SYSTEM: Brand: PALL LAPAROSHIELD® PLUS LAPAROSCOPIC SMOKE FILTRATION SYSTEM

## (undated) DEVICE — ELECTRD BLD EZ CLN MOD XLNG 2.75IN

## (undated) DEVICE — TOTAL TRAY, 16FR 10ML SIL FOLEY, URN: Brand: MEDLINE

## (undated) DEVICE — ENDOCUT SCISSOR TIP, DISPOSABLE: Brand: RENEW

## (undated) DEVICE — APPL CHLORAPREP HI/LITE 26ML ORNG

## (undated) DEVICE — SUT MNCRYL PLS ANTIB UD 4/0 PS2 18IN

## (undated) DEVICE — STPLR SKIN VISISTAT WD 35CT

## (undated) DEVICE — LOU LAP CHOLE: Brand: MEDLINE INDUSTRIES, INC.

## (undated) DEVICE — HARMONIC ACE +7 LAPAROSCOPIC SHEARS ADVANCED HEMOSTASIS 5MM DIAMETER 36CM SHAFT LENGTH  FOR USE WITH GRAY HAND PIECE ONLY: Brand: HARMONIC ACE

## (undated) DEVICE — KT CLN CLEANOR SCPE